# Patient Record
Sex: FEMALE | Race: WHITE
[De-identification: names, ages, dates, MRNs, and addresses within clinical notes are randomized per-mention and may not be internally consistent; named-entity substitution may affect disease eponyms.]

---

## 2020-09-07 ENCOUNTER — HOSPITAL ENCOUNTER (EMERGENCY)
Dept: HOSPITAL 35 - ER | Age: 25
Discharge: HOME | End: 2020-09-07
Payer: COMMERCIAL

## 2020-09-07 VITALS — HEIGHT: 67 IN | WEIGHT: 185.01 LBS | BODY MASS INDEX: 29.04 KG/M2

## 2020-09-07 VITALS — DIASTOLIC BLOOD PRESSURE: 65 MMHG | SYSTOLIC BLOOD PRESSURE: 103 MMHG

## 2020-09-07 DIAGNOSIS — Z20.828: ICD-10-CM

## 2020-09-07 DIAGNOSIS — R53.83: ICD-10-CM

## 2020-09-07 DIAGNOSIS — R07.9: ICD-10-CM

## 2020-09-07 DIAGNOSIS — R10.84: Primary | ICD-10-CM

## 2020-09-07 DIAGNOSIS — J45.909: ICD-10-CM

## 2020-09-07 DIAGNOSIS — M79.10: ICD-10-CM

## 2020-09-07 DIAGNOSIS — R06.02: ICD-10-CM

## 2020-09-07 DIAGNOSIS — R05: ICD-10-CM

## 2020-09-07 DIAGNOSIS — F17.210: ICD-10-CM

## 2020-09-07 LAB
ALBUMIN SERPL-MCNC: 3.9 G/DL (ref 3.4–5)
ALT SERPL-CCNC: 68 U/L (ref 30–65)
ANION GAP SERPL CALC-SCNC: 10 MMOL/L (ref 7–16)
AST SERPL-CCNC: 30 U/L (ref 15–37)
BASOPHILS NFR BLD AUTO: 0.8 % (ref 0–2)
BILIRUB SERPL-MCNC: 0.4 MG/DL (ref 0.2–1)
BILIRUB UR-MCNC: NEGATIVE MG/DL
BUN SERPL-MCNC: 5 MG/DL (ref 7–18)
CALCIUM SERPL-MCNC: 8.6 MG/DL (ref 8.5–10.1)
CHLORIDE SERPL-SCNC: 105 MMOL/L (ref 98–107)
CO2 SERPL-SCNC: 24 MMOL/L (ref 21–32)
COLOR UR: YELLOW
CREAT SERPL-MCNC: 0.7 MG/DL (ref 0.6–1)
EOSINOPHIL NFR BLD: 1.4 % (ref 0–3)
ERYTHROCYTE [DISTWIDTH] IN BLOOD BY AUTOMATED COUNT: 12.9 % (ref 10.5–14.5)
GLUCOSE SERPL-MCNC: 96 MG/DL (ref 74–106)
GRANULOCYTES NFR BLD MANUAL: 64.1 % (ref 36–66)
HCT VFR BLD CALC: 37.9 % (ref 37–47)
HGB BLD-MCNC: 12.9 GM/DL (ref 12–15)
KETONES UR STRIP-MCNC: NEGATIVE MG/DL
LIPASE: 265 U/L (ref 73–393)
LYMPHOCYTES NFR BLD AUTO: 27.6 % (ref 24–44)
MCH RBC QN AUTO: 29.4 PG (ref 26–34)
MCHC RBC AUTO-ENTMCNC: 34.1 G/DL (ref 28–37)
MCV RBC: 86.1 FL (ref 80–100)
MONOCYTES NFR BLD: 6.1 % (ref 1–8)
NEUTROPHILS # BLD: 5.3 THOU/UL (ref 1.4–8.2)
PLATELET # BLD: 309 THOU/UL (ref 150–400)
POTASSIUM SERPL-SCNC: 3.8 MMOL/L (ref 3.5–5.1)
PROT SERPL-MCNC: 7.2 G/DL (ref 6.4–8.2)
RBC # BLD AUTO: 4.4 MIL/UL (ref 4.2–5)
RBC # UR STRIP: NEGATIVE /UL
SODIUM SERPL-SCNC: 139 MMOL/L (ref 136–145)
SP GR UR STRIP: 1.01 (ref 1–1.03)
SQUAMOUS: (no result) /LPF (ref 0–3)
URINE CLARITY: CLEAR
URINE GLUCOSE-RANDOM*: NEGATIVE
URINE LEUKOCYTES-REFLEX: (no result)
URINE NITRITE-REFLEX: NEGATIVE
URINE PROTEIN (DIPSTICK): NEGATIVE
URINE WBC-REFLEX: (no result) /HPF (ref 0–5)
UROBILINOGEN UR STRIP-ACNC: 0.2 E.U./DL (ref 0.2–1)
WBC # BLD AUTO: 8.2 THOU/UL (ref 4–11)

## 2020-09-08 NOTE — EKG
[facility]
Saeid Lucero
[place]   [address]                     ELECTROCARDIOGRAM REPORT      
_______________________________________________________________________________
 
Name:       ASH YUSUF               [facility] #[facility]                     [id number]                       Account #:      [id number]  
Admission:  20    Attend Phys:                          
Discharge:  20    Date of Birth:  [birth year]  
                                                          Report #: [id number]
                                                                    [id number]
_______________________________________________________________________________
THIS REPORT FOR:  
 
cc:  NAUN - Chely family physician/PCP 
     NAUN - Chely family physician/PCP 
     Cody Cortez MD [facility]
THIS REPORT FOR:   //name//                          
 
                         [facility] ED
                                       
Test Date:    2020               Test Time:    12:07:08
Pat Name:     ASH YUSUF          Department:   
Patient ID:   [id number]            Room:          
Gender:       F                        Technician:   
:          [id number]               Requested By: Chad Serna
Order Number: [id number] MD:   Cody Cortez
                                 Measurements
Intervals                              Axis          
Rate:         82                       P:            60
CO:           146                      QRS:          23
QRSD:         102                      T:            25
QT:           391                                    
QTc:          457                                    
                           Interpretive Statements
Sinus rhythm
Normal tracing
No previous ECG available for comparison
Electronically Signed On 2020 8:24:12 CDT by Cody Cortez
[email or web address]
 
 
 
 
 
 
 
 
 
 
 
 
 
 
 
 
  <ELECTRONICALLY SIGNED>
   By: Cody Cortez MD, [facility]   
  2024
D: [id number]                           _____________________________________
T: 20                           Cody Cortez MD, FAC     /EPI